# Patient Record
Sex: MALE | Race: BLACK OR AFRICAN AMERICAN | Employment: STUDENT | URBAN - METROPOLITAN AREA
[De-identification: names, ages, dates, MRNs, and addresses within clinical notes are randomized per-mention and may not be internally consistent; named-entity substitution may affect disease eponyms.]

---

## 2017-02-06 ENCOUNTER — OFFICE VISIT (OUTPATIENT)
Dept: ORTHOPEDIC SURGERY | Age: 22
End: 2017-02-06

## 2017-02-06 VITALS
SYSTOLIC BLOOD PRESSURE: 145 MMHG | WEIGHT: 288.6 LBS | BODY MASS INDEX: 35.14 KG/M2 | HEIGHT: 76 IN | TEMPERATURE: 97.7 F | HEART RATE: 72 BPM | DIASTOLIC BLOOD PRESSURE: 70 MMHG

## 2017-02-06 DIAGNOSIS — M25.572 CHRONIC PAIN OF LEFT ANKLE: ICD-10-CM

## 2017-02-06 DIAGNOSIS — Z87.81 H/O FRACTURE OF ANKLE: Primary | ICD-10-CM

## 2017-02-06 DIAGNOSIS — G89.29 CHRONIC PAIN OF LEFT ANKLE: ICD-10-CM

## 2017-02-06 NOTE — PROGRESS NOTES
AMBULATORY PROGRESS NOTE      Patient: Kareen Polanco             MRN: [id number]     SSN: [id number] Body mass index is 35.13 kg/(m^2). YOB: 1995     AGE: 24 y.o. EX: male    PCP: No primary care provider on file. IMPRESSION/DIAGNOSIS AND TREATMENT PLAN     DIAGNOSES  1. H/O fracture of ankle    2. Chronic pain of left ankle        Orders Placed This Encounter    [[id number]] Ankle Min 3V      Kareen Polanco understands his diagnoses and the proposed plan. So, I really told him to take kind of a mental inventory and see if he is having intolerable pain and discomfort, that is when I recommend removing the hardware. He kind of states he just does not like to have any metal in him, but I want to see if he really hurts or has any discomfort in order to have that as an indication to remove the painful hardware. I can certainly see on his x-rays where the two screws distally are proud, but I want him to tell me how much discomfort he is having. He is going to think about it and he will let me know. Plan:    1) Referral to Eduardo Rothman for surgical scheduling  2) School note    RTO - for pre-op appointment    HPI AND EXAMINATION     Kareen Polanco IS A 24 y.o. male who presents to my outpatient office complaining of: left ankle pain. Patient presents to the office today stating that he would like to have his previous ankle hardware removed. He feels that the hardware reduces his mobility. The surgical procedure and recovery process was described to the patient and he understood the procedure. He would like to move forward with the surgical procedure. Patient has a h/o fracture in 2007. DIAGNOSTIC STUDIES: X-rays of the left ankle, three views, [facility], today, February 6, 2017:      1. There is a fibula plate, one-third, tubular fibular plate in placed. The two distal screws look as if they are backing out. They are a little proud.    2. There is a broken part of a screw within the distal tibia.  This is where he more than likely had a syndesmotic screw that was placed. 3. In the AP view, the ankle joint looks well-maintained. The fibula appears to be out to length. In the mortise films, there does appear to be some widening in the medial clear space. There are some degenerative changes across the syndesmosis, as there is bony formation, spanning from distal to proximal at least 10 cm on these x-rays, for which there is some bony formation at the syndesmosis. 4. In the lateral x-ray, the fibula plate is in excellent position. This is not a perfect lateral x-ray. It is slightly obliqued, but the subtalar joint is well-maintained. The ankle joint is hard to see just because I am seeing the double hump signal indicating that this is not a perfect lateral x-ray, but no osteochondral defects are seen on these images. Henry Medel is alert/oriented (name, location, time) and follows commands well. he  is in no acute distress and his affect and mood are appropriate. Left ANKLE and FOOT       Gait: normal  Tenderness: no across the left ankle. Cutaneous: No rashes, skin patches, wounds, or abrasions to the lower legs           Warm and Normal color. No regions of expressible drainage. Healed scar to fibula           Medial Border of Tibia Region: absent           Skin color, texture, turgor normal. Normal.  Joint Motion: ROM Ankle:Normal , Hindfoot: (ST,TN,CC Normal}, Forefoot toes:Normal  Neurologic Exam: Neuro: Motor: normal 5/5 strength in all tested muscle groups and Sensory : no sensory deficits noted. Slight numbness on the dorsolateral aspect of his left foot. Contractures: Gastrocnemius or Achilles Contractures absent  Joint / Tendon Stability: No Ankle or Subtalar instability or joint laxity.                        No peroneal sublux ability or dislocation  Alignment:  Normal Foot Alignment and  Flexible  Vascular: Normal Pulses/ NL Capillary refill, No evidence of DVT seen on physical exam.   No calf swelling, no tenderness to calf muscles. Lymphatic:  No Evidence of Lymphedema. CHART REVIEW     History reviewed. No pertinent past medical history. Current Outpatient Prescriptions   Medication Sig    guaiFENesin (MUCINEX) 1,200 mg Ta12 ER tablet Take 1,200 mg by mouth two (2) times a day. No current facility-administered medications for this visit. No Known Allergies  Past Surgical History   Procedure Laterality Date    Hx ankle fracture tx       Social History     Occupational History    Not on file. Social History Main Topics    Smoking status: Never Smoker    Smokeless tobacco: Never Used    Alcohol use Yes      Comment: socially    Drug use: Yes     Special: Marijuana    Sexual activity: Not on file     History reviewed. No pertinent family history. REVIEW OF SYSTEMS : 2/6/2017  ALL BELOW ARE Negative except : SEE HPI       Constitutional: Negative for fever, chills and weight loss. Neg Weigh Loss  Cardiovascular: Negative for chest pain, claudication and leg swelling. SOB, BRYANT   Gastrointestinal: Negative for  pain, N/V/D/C, Blood in stool or urine,dysuria, hematuria,        Incontinence, pelvic pain  Musculoskeletal: see HPI. Neurological: Negative for dizziness and weakness. Negative for headaches,Visual Changes, Confusion, Seizures,   Psychiatric/Behavioral: Negative for depression, memory loss and substance abuse. Extremities:  Negative for  hair changes, rash or skin lesion changes. Hematologic: Negative for Bleeding problems, bruising, pallor or swollen lymph nodes.   Peripheral Vascular: No calf pain, vascular vein tenderness to calf pain              No calf throbbing, posterior knee throbbing pain    DIAGNOSTIC IMAGING     See above    Written by Susan Carter, as dictated by Nicky Hernandez MD.

## 2017-02-06 NOTE — MR AVS SNAPSHOT
Visit Information Date & Time Provider Department Dept. Phone Encounter #  
 2/6/2017  1:30 PM Guille Kidd, [address] [facility] [phone] [id number] Upcoming Health Maintenance Date Due DTaP/Tdap/Td series (1 - Tdap) 3/13/2015 INFLUENZA AGE 9 TO ADULT 8/1/2016 Allergies as of 2/6/2017  Review Complete On: 2/6/2017 By: Weston Bravo No Known Allergies Current Immunizations  Never Reviewed No immunizations on file. Not reviewed this visit You Were Diagnosed With   
  
 Codes Comments H/O fracture of ankle    -  Primary ICD-10-CM: Z87.81 ICD-9-CM: V15.51 Chronic pain of left ankle     ICD-10-CM: M25.572, G89.29 ICD-9-CM: 719.47, 338.29 Vitals BP Pulse Temp Height(growth percentile) Weight(growth percentile) BMI  
 145/70 72 97.7 °F (36.5 °C) (Oral) 6' 4\" (1.93 m) 288 lb 9.6 oz (130.9 kg) 35.13 kg/m2 Smoking Status Never Smoker BMI and BSA Data Body Mass Index Body Surface Area  
 35.13 kg/m 2 2.65 m 2 Your Updated Medication List  
  
   
This list is accurate as of: 2/6/17  2:25 PM.  Always use your most recent med list.  
  
  
  
  
 MUCINEX 1,200 mg Ta12 ER tablet Generic drug:  guaiFENesin Take 1,200 mg by mouth two (2) times a day. We Performed the Following AMB POC XRAY, ANKLE; COMPLETE, 3+ VIE [84420 CPT(R)] Patient Instructions Please follow up with Orin Mccollum for surgical scheduling and pre-op appointment. You are advised to contact us if your condition worsens. Ankle Fracture: Rehab Exercises Your Care Instructions Here are some examples of typical rehabilitation exercises for your condition. Start each exercise slowly. Ease off the exercise if you start to have pain. Your doctor or physical therapist will tell you when you can start these exercises and which ones will work best for you. How to do the exercises Calf stretch (knee straight) Note: For this exercise, you will need a towel. 1. Sit with your affected leg straight and supported on the floor. Your other leg should be bent, with that foot flat on the floor. 2. Place a towel around your affected foot just under the toes. 3. Hold one end of the towel in each hand, with your hands above your knees. 4. Pull back gently with the towel so that your foot stretches toward you. 5. Hold the position for at least 15 to 30 seconds. 6. Repeat 2 to 4 times a session, up to 5 sessions a day. Calf stretch (knee bent) Note: For this exercise, you will need a towel. You will also need a pillow or foam roll. 1. Sit with your affected leg straight and supported on the floor. Your other leg should be bent, with that foot flat on the floor. 2. Place a pillow or foam roll under your affected leg. 3. Place a towel around your affected foot just under the toes. 4. Hold one end of the towel in each hand, with your hands above your knees. 5. Pull back gently with the towel so that your foot stretches toward you. 6. Hold the position for at least 15 to 30 seconds. 7. Repeat 2 to 4 times a session, up to 5 sessions a day. Ankle plantar flexion 1. Sit with your affected leg straight and supported on the floor. Your other leg should be bent, with that foot flat on the floor. 2. Keeping your affected leg straight, gently flex your foot downward so your toes are pointed away from your body. Then slowly relax your foot to the starting position. 3. Repeat 8 to 12 times. Ankle dorsiflexion 1. Sit with your affected leg straight and supported on the floor. Your other leg should be bent, with that foot flat on the floor. 2. Keeping your affected leg straight, gently flex your foot back toward your body so your toes point upward. Then slowly relax your foot to the starting position. 3. Repeat 8 to 12 times. Resisted ankle plantar flexion Note: For the next four exercises, you will need elastic exercise material, such as surgical tubing or Thera-Band. 1. Sit with your affected leg straight and supported on the floor. Your other leg should be bent, with that foot flat on the floor. 2. Place an elastic band around your affected foot just under the toes. 3. Hold each end of the band in each hand, with your hands above your knees. 4. Keeping your affected leg straight, gently flex your foot downward so your toes are pointed away from your body. Then slowly relax your foot to the starting position. 5. Repeat 8 to 12 times. Resisted ankle dorsiflexion 1. Tie the ends of an exercise band together to form a loop. Attach one end of the loop to a secure object, like a table leg, or shut a door on it to hold it in place. (Or you can have someone hold one end of the loop to provide resistance.) 2. While sitting on the floor or in a chair, loop the other end of the band over the top of your affected foot. 3. Keeping your knee and leg straight, slowly flex your foot toward you to pull back on the exercise band, and then slowly relax. 4. Repeat 8 to 12 times. Resisted ankle inversion 1. Sit on the floor with your good leg crossed over your other leg. 2. Hold both ends of an exercise band and loop the band around the inside of your affected foot. Then press your good foot against the band. 3. Keeping your legs crossed, slowly push your affected foot against the band so that foot moves away from your good foot. Then slowly relax. 4. Repeat 8 to 12 times. Resisted ankle eversion 1. Sit on the floor with your legs straight. 2. Hold both ends of an exercise band and loop the band around the outside of your affected foot. Then press your good foot against the band. 3. Keeping your leg straight, slowly push your affected foot outward against the band and away from your good foot without letting your leg rotate. Then slowly relax. 4. Repeat 8 to 12 times. Ankle alphabet 1. Sit in a chair with your feet flat on the floor. (You can also do this exercise lying on your back with your affected leg propped up on a pillow). 2. Lift the heel of your affected foot off the floor, and slowly trace the letters of the alphabet. Heel raises 1. Stand with your feet a few inches apart, with your hands lightly resting on a counter or chair in front of you. 2. Slowly raise your heels off the floor while keeping your knees straight. 3. Hold for about 6 seconds, then slowly lower your heels to the floor. 4. Do 8 to 12 repetitions several times during the day. Follow-up care is a key part of your treatment and safety. Be sure to make and go to all appointments, and call your doctor if you are having problems. It's also a good idea to know your test results and keep a list of the medicines you take. Where can you learn more? Go to http://toya-rochelle.info/. Enter T800 in the search box to learn more about \"Ankle Fracture: Rehab Exercises. \" Current as of: May 23, 2016 Content Version: 11.1 © 3658-4201 [employer]. Care instructions adapted under license by [employer] (which disclaims liability or warranty for this information). If you have questions about a medical condition or this instruction, always ask your healthcare professional. [place] [address] any warranty or liability for your use of this information. Introducing [facility]! Geena Alvares introduces [facility] patient portal. Now you can access parts of your medical record, email your doctor's office, and request medication refills online. 1. In your internet browser, go to [email or web address] 2. Click on the First Time User? Click Here link in the Sign In box. You will see the New Member Sign Up page. 3. Enter your [facility] Access Code exactly as it appears below.  You will not need to use this code after youve completed the sign-up process. If you do not sign up before the expiration date, you must request a new code. · Hang w/ Access Code: [facility] Expires: 5/7/2017 12:48 PM 
 
4. Enter the last four digits of your Social Security Number (xxxx) and Date of Birth (mm/dd/yyyy) as indicated and click Submit. You will be taken to the next sign-up page. 5. Create a Hang w/ ID. This will be your Hang w/ login ID and cannot be changed, so think of one that is secure and easy to remember. 6. Create a Hang w/ password. You can change your password at any time. 7. Enter your Password Reset Question and Answer. This can be used at a later time if you forget your password. 8. Enter your e-mail address. You will receive e-mail notification when new information is available in [address]. 9. Click Sign Up. You can now view and download portions of your medical record. 10. Click the Download Summary menu link to download a portable copy of your medical information. If you have questions, please visit the Frequently Asked Questions section of the Hang w/ website. Remember, Hang w/ is NOT to be used for urgent needs. For medical emergencies, dial 911. Now available from your iPhone and Android! Please provide this summary of care documentation to your next provider. If you have any questions after today's visit, please call [phone].

## 2017-02-06 NOTE — LETTER
NOTIFICATION RETURN TO WORK / SCHOOL 
 
2/6/2017 2:18 PM 
 
Mr. Cassi Mendoza [address] [address] To Whom It May Concern: 
 
Cassi Mendoza is currently under the care of [address] [place] [address] Byron Ryan. Mr. Demetri Trammell was seen in my office today for assessment of a previous ankle fracture. Please excuse him from class today (2/6/2017). If there are questions or concerns please have the patient contact our office.  
 
 
 
Sincerely, 
 
 
Enedelia Corado MD

## 2017-02-06 NOTE — PATIENT INSTRUCTIONS
Please follow up with Lisa Plunkett for surgical scheduling and pre-op appointment. You are advised to contact us if your condition worsens. Ankle Fracture: Rehab Exercises  Your Care Instructions  Here are some examples of typical rehabilitation exercises for your condition. Start each exercise slowly. Ease off the exercise if you start to have pain. Your doctor or physical therapist will tell you when you can start these exercises and which ones will work best for you. How to do the exercises  Calf stretch (knee straight)    Note: For this exercise, you will need a towel. 1. Sit with your affected leg straight and supported on the floor. Your other leg should be bent, with that foot flat on the floor. 2. Place a towel around your affected foot just under the toes. 3. Hold one end of the towel in each hand, with your hands above your knees. 4. Pull back gently with the towel so that your foot stretches toward you. 5. Hold the position for at least 15 to 30 seconds. 6. Repeat 2 to 4 times a session, up to 5 sessions a day. Calf stretch (knee bent)    Note: For this exercise, you will need a towel. You will also need a pillow or foam roll. 1. Sit with your affected leg straight and supported on the floor. Your other leg should be bent, with that foot flat on the floor. 2. Place a pillow or foam roll under your affected leg. 3. Place a towel around your affected foot just under the toes. 4. Hold one end of the towel in each hand, with your hands above your knees. 5. Pull back gently with the towel so that your foot stretches toward you. 6. Hold the position for at least 15 to 30 seconds. 7. Repeat 2 to 4 times a session, up to 5 sessions a day. Ankle plantar flexion    1. Sit with your affected leg straight and supported on the floor. Your other leg should be bent, with that foot flat on the floor.   2. Keeping your affected leg straight, gently flex your foot downward so your toes are pointed away from your body. Then slowly relax your foot to the starting position. 3. Repeat 8 to 12 times. Ankle dorsiflexion    1. Sit with your affected leg straight and supported on the floor. Your other leg should be bent, with that foot flat on the floor. 2. Keeping your affected leg straight, gently flex your foot back toward your body so your toes point upward. Then slowly relax your foot to the starting position. 3. Repeat 8 to 12 times. Resisted ankle plantar flexion    Note: For the next four exercises, you will need elastic exercise material, such as surgical tubing or Thera-Band. 1. Sit with your affected leg straight and supported on the floor. Your other leg should be bent, with that foot flat on the floor. 2. Place an elastic band around your affected foot just under the toes. 3. Hold each end of the band in each hand, with your hands above your knees. 4. Keeping your affected leg straight, gently flex your foot downward so your toes are pointed away from your body. Then slowly relax your foot to the starting position. 5. Repeat 8 to 12 times. Resisted ankle dorsiflexion    1. Tie the ends of an exercise band together to form a loop. Attach one end of the loop to a secure object, like a table leg, or shut a door on it to hold it in place. (Or you can have someone hold one end of the loop to provide resistance.)  2. While sitting on the floor or in a chair, loop the other end of the band over the top of your affected foot. 3. Keeping your knee and leg straight, slowly flex your foot toward you to pull back on the exercise band, and then slowly relax. 4. Repeat 8 to 12 times. Resisted ankle inversion    1. Sit on the floor with your good leg crossed over your other leg. 2. Hold both ends of an exercise band and loop the band around the inside of your affected foot. Then press your good foot against the band.   3. Keeping your legs crossed, slowly push your affected foot against the band so that foot moves away from your good foot. Then slowly relax. 4. Repeat 8 to 12 times. Resisted ankle eversion    1. Sit on the floor with your legs straight. 2. Hold both ends of an exercise band and loop the band around the outside of your affected foot. Then press your good foot against the band. 3. Keeping your leg straight, slowly push your affected foot outward against the band and away from your good foot without letting your leg rotate. Then slowly relax. 4. Repeat 8 to 12 times. Ankle alphabet    1. Sit in a chair with your feet flat on the floor. (You can also do this exercise lying on your back with your affected leg propped up on a pillow). 2. Lift the heel of your affected foot off the floor, and slowly trace the letters of the alphabet. Heel raises    1. Stand with your feet a few inches apart, with your hands lightly resting on a counter or chair in front of you. 2. Slowly raise your heels off the floor while keeping your knees straight. 3. Hold for about 6 seconds, then slowly lower your heels to the floor. 4. Do 8 to 12 repetitions several times during the day. Follow-up care is a key part of your treatment and safety. Be sure to make and go to all appointments, and call your doctor if you are having problems. It's also a good idea to know your test results and keep a list of the medicines you take. Where can you learn more? Go to http://toya-rochelle.info/. Enter T800 in the search box to learn more about \"Ankle Fracture: Rehab Exercises. \"  Current as of: May 23, 2016  Content Version: 11.1  © 2789-4654 [employer]. Care instructions adapted under license by [employer] (which disclaims liability or warranty for this information). If you have questions about a medical condition or this instruction, always ask your healthcare professional. [address] any warranty or liability for your use of this information.

## 2017-02-06 NOTE — PROCEDURES
RADIOGRAPHIC INTERPRETATION    The impression for this/these radiograph(s) will be found in the office visit progress note for this encounter from 2/6/2017.     Amrita Sahu MD  2/6/2017  1:48 PM

## 2017-03-01 ENCOUNTER — OFFICE VISIT (OUTPATIENT)
Dept: FAMILY MEDICINE CLINIC | Age: 22
End: 2017-03-01

## 2017-03-01 VITALS
OXYGEN SATURATION: 96 % | HEIGHT: 76 IN | RESPIRATION RATE: 12 BRPM | DIASTOLIC BLOOD PRESSURE: 78 MMHG | HEART RATE: 74 BPM | SYSTOLIC BLOOD PRESSURE: 128 MMHG | TEMPERATURE: 98.4 F | BODY MASS INDEX: 35.07 KG/M2 | WEIGHT: 288 LBS

## 2017-03-01 DIAGNOSIS — J40 BRONCHITIS: Primary | ICD-10-CM

## 2017-03-01 RX ORDER — LEVOFLOXACIN 500 MG/1
500 TABLET, FILM COATED ORAL DAILY
Qty: 7 TAB | Refills: 0 | Status: CANCELLED | OUTPATIENT
Start: 2017-03-01 | End: 2017-03-08

## 2017-03-01 RX ORDER — AZITHROMYCIN 500 MG/1
500 TABLET, FILM COATED ORAL DAILY
Qty: 7 TAB | Refills: 0 | Status: SHIPPED | OUTPATIENT
Start: 2017-03-01 | End: 2017-03-08

## 2017-03-01 RX ORDER — BENZONATATE 100 MG/1
200 CAPSULE ORAL
Qty: 30 CAP | Refills: 1 | Status: SHIPPED | OUTPATIENT
Start: 2017-03-01 | End: 2017-03-08

## 2017-03-01 NOTE — PROGRESS NOTES
Subjective:      Hollie Marie is an 24 y.o. male here for evaluation of cough. The cough is productive of clear sputum, with shortness of breath during the cough, chest is painful during coughing, harsh, worsening over time and is aggravated by exercise. Onset of symptoms was 3 months ago, unchanged since that time. Associated symptoms include postnasal drip, SOB, sputum production. Patient does not have a history of asthma. Patient does have a history of environmental allergens. Patient did not have recent travel. Patient does not have a history of smoking. Patient  did not have previous Chest X-ray. Patient has not had a PPD done. History reviewed. No pertinent past medical history. Family History   Problem Relation Age of Onset    Family history unknown: Yes     Current Outpatient Prescriptions   Medication Sig Dispense Refill    ipratropium (ATROVENT HFA) 17 mcg/actuation inhaler Take 2 Puffs by inhalation every four (4) hours as needed for Wheezing. 1 Inhaler 4    azithromycin (ZITHROMAX) 500 mg tab Take 1 Tab by mouth daily for 7 days. 7 Tab 0    benzonatate (TESSALON) 100 mg capsule Take 2 Caps by mouth three (3) times daily as needed for Cough for up to 7 days. 30 Cap 1    guaiFENesin (MUCINEX) 1,200 mg Ta12 ER tablet Take 1,200 mg by mouth two (2) times a day. No Known Allergies    Review of Systems  A comprehensive review of systems was negative except for that written in the HPI. Objective:     Visit Vitals    /78    Pulse 74    Temp 98.4 °F (36.9 °C) (Oral)    Resp 12    Ht 6' 4\" (1.93 m)    Wt 288 lb (130.6 kg)    SpO2 96%    BMI 35.06 kg/m2       General:  alert, cooperative, no distress, appears stated age   HEENT:  ENT exam normal, no neck nodes or sinus tenderness   Lungs: clear to auscultation bilaterally   Heart:  regular rate and rhythm, S1, S2 normal, no murmur, click, rub or gallop   Abdomen: soft, non-tender.  Bowel sounds normal. No masses,  no organomegaly   Extremities: extremities normal, atraumatic, no cyanosis or edema      Neurological: alert, oriented x 3, no defects noted in general exam.     Assessment:     Acute Bronchitis    Plan:   Antibiotics per Medication orders  Anti-tussives per Medication orders  B-agonist Inhaler. Follow up with  Your primary care provider or urgent care  if symptoms worsens or fail to improve within the next three to four days.

## 2017-03-07 RX ORDER — DOXYCYCLINE 100 MG/1
100 CAPSULE ORAL 2 TIMES DAILY
Qty: 20 CAP | Refills: 0 | Status: SHIPPED | OUTPATIENT
Start: 2017-03-07 | End: 2017-03-17

## 2017-03-07 NOTE — PROGRESS NOTES
Patient called and stated that he is still not feeling well. He complained of sinus pressure, teeth and jaw pain and low grade temp for the past two days. I will start doxycycline. I also informed patient to follow up with his PCP.

## 2017-03-22 ENCOUNTER — HOSPITAL ENCOUNTER (OUTPATIENT)
Dept: PHYSICAL THERAPY | Age: 22
End: 2017-03-22

## 2017-03-24 ENCOUNTER — HOSPITAL ENCOUNTER (OUTPATIENT)
Dept: PHYSICAL THERAPY | Age: 22
Discharge: HOME | End: 2017-03-24
Payer: COMMERCIAL

## 2017-03-24 PROCEDURE — 97760 ORTHOTIC MGMT&TRAING 1ST ENC: CPT

## 2017-03-24 NOTE — PROGRESS NOTES
PHYSICAL THERAPY - DAILY TREATMENT NOTE    Patient Name: Felicita Alvarez        Date: 3/24/2017  : 1995   YES Patient  Verified  Visit #:   1   of   1  Insurance: Payor: Josephine Lyon / Plan: [employer] [place] / Product Type: [employer] /      In time: 905 Out time: 940   Total Treatment Time: 35     Medicare Time Tracking (below)   Total Timed Codes (min):  na 1:1 Treatment Time:  na     TREATMENT AREA =  Foot pain [M79.673]    SUBJECTIVE  Pain Level (on 0 to 10 scale):  0  / 10   Medication Changes/New allergies or changes in medical history, any new surgeries or procedures? NO    If yes, update Summary List   Subjective Functional Status/Changes:  []  No changes reported     See POC          OBJECTIVE    Semi-custom orthotic fitting 30 minutes   min Patient Education:  YES  Reviewed HEP   []  Progressed/Changed HEP based on: Other Objective/Functional Measures:    See POC  Semi-custom orthotic fitting-unable to provide rear foot varus attachment-will do so when order arrives     Post Treatment Pain Level (on 0 to 10) scale:   0  / 10     ASSESSMENT  Assessment/Changes in Function:     See POC     []  See Progress Note/Recertification   Patient will continue to benefit from skilled PT services to modify and progress therapeutic interventions, address functional mobility deficits, address ROM deficits, address strength deficits, analyze and address soft tissue restrictions, analyze and cue movement patterns, analyze and modify body mechanics/ergonomics and assess and modify postural abnormalities to attain remaining goals. Progress toward goals / Updated goals:    See POC     PLAN  [x]  Upgrade activities as tolerated YES Continue plan of care   []  Discharge due to :    []  Other:      Therapist: Dorota Thao PT    Date: 3/24/2017 Time: 9:47 AM     No future appointments.

## 2017-03-24 NOTE — PROGRESS NOTES
[address] PHYSICAL THERAPY  [address], [address], [place], [address] - Phone: [phone]  Fax: [phone] / [phone] [facility]  Patient Name: Charles Noriega : 1995   Medical   Diagnosis: B Pes Planus Treatment Diagnosis: Foot pain [M79.673]   Onset Date: Chronic     Referral Source: Messi Turner MD Start of [facility]): 3/24/2017   Prior Hospitalization: See medical history Provider #: [id number]   Prior Level of Function: Chronic history of difficulty with exercise and athletic activities   Comorbidities: None   Medications: Verified on Patient Summary List   The Plan of Care and following information is based on the information from the initial evaluation.   ===========================================================================================  Assessment / key information:  Patient is a 20year old male presenting to therapy with signs and symptoms consistent with B pes planus requesting semi-custom orthotics. Patient reports he has had a chronic history of difficulty with exercising and athletic activities secondary to having flat feet. Patient previously fracture his L fibula in  with result fixation. Patient underwent PT after this but believes it did not help much. Patient reports increased difficulty with exercise and athletic activities. Patient notes symptoms feel better with rest. Patient rates pain at worst 0/10 and 0/10 at best.   Objective Data: Inspection-Significant pes planus noted bilaterally in standing. Navicular drop and calcaneal eversion noted with standing and walking. Squat mechanics: excessive pronation with tibial IR result in knee valgus bilaterally. B ankle AROM and strength WNL and pain free. FOTO: 66/100. Patient educated on diagnosis, prognosis, POC and HEP. Patient issued copy of HEP and denied additional questions.  Patient will benefit from skilled PT in order to address these impairments and functional limitations.   ===========================================================================================  Eval Complexity: History LOW Complexity : Zero comorbidities / personal factors that will impact the outcome / POC;  Examination  MEDIUM Complexity : 3 Standardized tests and measures addressing body structure, function, activity limitation and / or participation in recreation ; Presentation LOW Complexity : Stable, uncomplicated ;  Decision Making MEDIUM Complexity : FOTO score of 26-74; Overall Complexity LOW   Problem List: pain affecting function, decrease ROM, decrease strength, impaired gait/ balance, decrease ADL/ functional abilitiies, decrease activity tolerance and decrease flexibility/ joint mobility   Treatment Plan may include any combination of the following: Therapeutic exercise, Therapeutic activities, Neuromuscular re-education, Manual therapy, Patient education and Functional mobility training  Patient / Family readiness to learn indicated by: asking questions, trying to perform skills and interest  Persons(s) to be included in education: patient (P)  Barriers to Learning/Limitations: no  Measures taken:    Patient Goal (s): Improve foot positioning with athletic activities. Patient self reported health status: excellent  Rehabilitation Potential: excellent     Long Term Goals: To be accomplished in  1  weeks:  1. Patient will be fitted with semi-custom orthotics in order to improve technique with athletic activities. Frequency / Duration:   Patient to be seen  1  times per week for 1  weeks:  Patient / Caregiver education and instruction: activity modification  G-Codes (GP): na  Therapist Signature:  Jasen Tinsley PT Date: 5/87/9731   Certification Period: na Time: 9:39 AM   ===========================================================================================  I certify that the above Physical Therapy Services are being furnished while the patient is under my care. I agree with the treatment plan and certify that this therapy is necessary. Physician Signature:        Date:       Time:     Please sign and return to [facility] or you may fax the signed copy to [phone]. Thank you.

## 2017-03-28 RX ORDER — FLUCONAZOLE 200 MG/1
200 TABLET ORAL DAILY
Qty: 14 TAB | Refills: 0 | Status: SHIPPED | OUTPATIENT
Start: 2017-03-28 | End: 2017-04-11

## 2017-03-28 RX ORDER — NITROFURANTOIN 25; 75 MG/1; MG/1
100 CAPSULE ORAL 2 TIMES DAILY
Qty: 14 CAP | Refills: 0 | Status: SHIPPED | OUTPATIENT
Start: 2017-03-28 | End: 2017-04-04

## 2017-06-20 NOTE — PROGRESS NOTES
[address] [facility] [address]  THERAPY  [facility], [address] [facility], [place], [address] [place] [address] - Phone: [phone]  Fax: [phone]  DISCHARGE SUMMARY  Patient Name: Haresh España : 1995   Treatment/Medical Diagnosis: Foot pain [U86.172]   Referral Source: Jeremy Baker MD     Date of Initial Visit: 3/24/17 Attended Visits: 1 Missed Visits: 0     SUMMARY OF TREATMENT  Patient attended one session for fitting of semi-custom orthotics. Patient was provided orthotics and instructed on proper usage. CURRENT STATUS  Patient needed only one session in order to be fitted for semi-custom orthotics. Patient is appropriate to DC at this time. RECOMMENDATIONS  Discontinue therapy. Progressing towards or have reached established goals. If you have any questions/comments please contact us directly at [phone]. Thank you for allowing us to assist in the care of your patient. Therapist Signature:  Ronold Gowers, PT Date: 17     Time: 3:06 PM